# Patient Record
Sex: FEMALE | Race: WHITE | Employment: FULL TIME | URBAN - METROPOLITAN AREA
[De-identification: names, ages, dates, MRNs, and addresses within clinical notes are randomized per-mention and may not be internally consistent; named-entity substitution may affect disease eponyms.]

---

## 2023-06-07 ENCOUNTER — OFFICE VISIT (OUTPATIENT)
Age: 38
End: 2023-06-07

## 2023-06-07 VITALS
RESPIRATION RATE: 16 BRPM | DIASTOLIC BLOOD PRESSURE: 82 MMHG | HEART RATE: 78 BPM | HEIGHT: 64 IN | TEMPERATURE: 98.1 F | BODY MASS INDEX: 31.24 KG/M2 | OXYGEN SATURATION: 100 % | SYSTOLIC BLOOD PRESSURE: 139 MMHG | WEIGHT: 183 LBS

## 2023-06-07 DIAGNOSIS — Z23 NEED FOR TETANUS, DIPHTHERIA, AND ACELLULAR PERTUSSIS (TDAP) VACCINE: ICD-10-CM

## 2023-06-07 DIAGNOSIS — S61.411A LACERATION OF RIGHT HAND WITHOUT FOREIGN BODY, INITIAL ENCOUNTER: Primary | ICD-10-CM

## 2023-06-07 DIAGNOSIS — R55 VASOVAGAL SYNCOPE: ICD-10-CM

## 2023-06-07 RX ORDER — SPIRONOLACTONE 100 MG/1
TABLET, FILM COATED ORAL
COMMUNITY

## 2023-06-07 RX ORDER — CEPHALEXIN 500 MG/1
500 CAPSULE ORAL 3 TIMES DAILY
Qty: 15 CAPSULE | Refills: 0 | Status: SHIPPED | OUTPATIENT
Start: 2023-06-07 | End: 2023-06-12

## 2023-06-07 RX ORDER — BACITRACIN ZINC AND POLYMYXIN B SULFATE 500; 1000 [USP'U]/G; [USP'U]/G
OINTMENT TOPICAL 2 TIMES DAILY
Qty: 15 G | Refills: 0 | Status: SHIPPED | OUTPATIENT
Start: 2023-06-07 | End: 2023-06-14

## 2023-06-07 NOTE — PROGRESS NOTES
Beck Lugo (:  1985) is a 42 y.o. female,New patient, here for evaluation of the following chief complaint(s):  New Patient and Laceration (Patient has a small laceration to her right hand, happened last night around 7 pm while trying to separate frozen hamberger's/)      SUBJECTIVE:   Beck Lugo sustained laceration of hand 15 hours ago. Nature of injury: kitchen knife. Tetanus vaccination status reviewed: tetanus status unknown to the patient. Was talking on phone, fainted. Did not hit head, no vomiting, or headaches. Denies hx fainting. OBJECTIVE:   Patient appears well, vitals are normal. Laceration 0.5 cm noted. Description: clean wound edges, no foreign bodies. Neurovascular and tendon structures are intact. Heart - RRR. Neuro - speech clear, a&o x 4, gait steady, CN II-XII intact. MARGARITA. ASSESSMENT:     Visit Diagnoses and Associated Orders       Laceration of right hand without foreign body, initial encounter    -  Primary    cephALEXin (KEFLEX) 500 MG capsule [4010]      bacitracin-polymyxin b (POLYSPORIN) 500-81258 UNIT/GM ointment [855]           Need for tetanus, diphtheria, and acellular pertussis (Tdap) vaccine        Tdap, BOOSTRIX, (age 8 yrs+), IM [30823 Custom]           Vasovagal syncope             ORDERS WITHOUT AN ASSOCIATED DIAGNOSIS    spironolactone (ALDACTONE) 100 MG tablet [70240]                 PLAN:    Wound cleansed. Antibiotic ointment and dressing applied. Antibiotics per orders. Wound care instructions provided. Observe for any signs of infection or other problems.       ROSA Torres - NP

## 2025-05-01 ENCOUNTER — OFFICE VISIT (OUTPATIENT)
Age: 40
End: 2025-05-01

## 2025-05-01 VITALS
BODY MASS INDEX: 28.67 KG/M2 | HEART RATE: 85 BPM | WEIGHT: 167 LBS | DIASTOLIC BLOOD PRESSURE: 81 MMHG | RESPIRATION RATE: 16 BRPM | SYSTOLIC BLOOD PRESSURE: 118 MMHG | TEMPERATURE: 98.5 F | OXYGEN SATURATION: 99 %

## 2025-05-01 DIAGNOSIS — R05.9 COUGH, UNSPECIFIED TYPE: Primary | ICD-10-CM

## 2025-05-01 RX ORDER — METHYLPREDNISOLONE 4 MG/1
TABLET ORAL
Qty: 21 TABLET | Refills: 0 | Status: SHIPPED | OUTPATIENT
Start: 2025-05-01 | End: 2025-05-07

## 2025-05-01 RX ORDER — GUAIFENESIN 600 MG/1
600 TABLET, EXTENDED RELEASE ORAL 2 TIMES DAILY
Qty: 30 TABLET | Refills: 0 | Status: SHIPPED | OUTPATIENT
Start: 2025-05-01 | End: 2025-05-16

## 2025-05-01 RX ORDER — BENZONATATE 100 MG/1
100-200 CAPSULE ORAL 3 TIMES DAILY PRN
Qty: 60 CAPSULE | Refills: 0 | Status: SHIPPED | OUTPATIENT
Start: 2025-05-01 | End: 2025-05-08

## 2025-05-01 RX ORDER — LEVONORGESTREL 19.5 MG/1
INTRAUTERINE DEVICE INTRAUTERINE
COMMUNITY

## 2025-05-01 RX ORDER — ALBUTEROL SULFATE 0.83 MG/ML
2.5 SOLUTION RESPIRATORY (INHALATION) ONCE
Status: COMPLETED | OUTPATIENT
Start: 2025-05-01 | End: 2025-05-01

## 2025-05-01 RX ADMIN — ALBUTEROL SULFATE 2.5 MG: 0.83 SOLUTION RESPIRATORY (INHALATION) at 09:55

## 2025-05-01 ASSESSMENT — ENCOUNTER SYMPTOMS: COUGH: 1

## 2025-05-01 NOTE — PROGRESS NOTES
Adriana Soto (:  1985) is a 39 y.o. female,Established patient, here for evaluation of the following chief complaint(s):  Cough (Cough x 1 week )      Assessment & Plan :  Visit Diagnoses and Associated Orders         Cough, unspecified type    -  Primary    guaiFENesin (MUCINEX) 600 MG extended release tablet [73809]      benzonatate (TESSALON) 100 MG capsule [988]      albuterol (PROVENTIL) (2.5 MG/3ML) 0.083% nebulizer solution 2.5 mg [250]      methylPREDNISolone (MEDROL DOSEPACK) 4 MG tablet [4991]           ORDERS WITHOUT AN ASSOCIATED DIAGNOSIS    Levonorgestrel (KYLEENA) IUD 19.5 mg [187838]            We gave her an albuterol nebulizer in clinic for some diminished breath movement through the right lower lobe.  Lungs opened up with this treatment and were clear.  Likely a bit of reactive airway disease in response to some environmental allergens outside right now    Medrol Dosepak: Follow the instructions on the blister pack.  This is a steroid and will calm down inflammation    Insurance will not pay for an albuterol inhaler     Guaifenesin and Tessalon Perles as needed for cough    Start daily antihistamine such as Zyrtec or Allegra as well as a daily nasal steroid such as Flonase or Nasacort       Subjective :    Cough         39 y.o. female presents with cough for about 5 days.  Originally had some nasal congestion and slight sore throat with the cough.  Those have since resolved but the cough is continued.  Denies fever, ear pain, nausea or vomiting.  A little shortness of breath with the cough but not at other times.  No history of reactive airway disease or asthma.  She is taken some over-the-counter cold and flu medication without significant improvement       Vitals:    25 0921   BP: 118/81   BP Site: Left Upper Arm   Patient Position: Sitting   BP Cuff Size: Large Adult   Pulse: 85   Resp: 16   Temp: 98.5 °F (36.9 °C)   SpO2: 99%   Weight: 75.8 kg (167 lb)       No results found for